# Patient Record
Sex: MALE | Race: WHITE | Employment: FULL TIME | URBAN - METROPOLITAN AREA
[De-identification: names, ages, dates, MRNs, and addresses within clinical notes are randomized per-mention and may not be internally consistent; named-entity substitution may affect disease eponyms.]

---

## 2020-05-05 ENCOUNTER — VIRTUAL VISIT (OUTPATIENT)
Dept: INTERNAL MEDICINE | Facility: CLINIC | Age: 50
End: 2020-05-05
Payer: COMMERCIAL

## 2020-05-05 DIAGNOSIS — R35.0 URINARY FREQUENCY: Primary | ICD-10-CM

## 2020-05-05 ASSESSMENT — PAIN SCALES - GENERAL: PAINLEVEL: NO PAIN (0)

## 2020-05-05 NOTE — NURSING NOTE
Chief Complaint   Patient presents with     Establish Care     Establish care.     Kimberlee Alegre, RMA 1:15 PM  5/5/2020

## 2020-05-05 NOTE — PROGRESS NOTES
50 yo reports voiding in small amounts every 30-60 minutes for the past 2-3 weeks.  No precipitating events.  Does report similar episodes in past once while in intermediate.  Also reports weight loss of 10-15 pounds over the past 2 months although had put on weight before.  Discussed need for UA and renal function.  He will get this done and we will get back to him'  (5 minutes)  Corbin Stevenson MD